# Patient Record
Sex: FEMALE | Race: WHITE | NOT HISPANIC OR LATINO | Employment: FULL TIME | URBAN - METROPOLITAN AREA
[De-identification: names, ages, dates, MRNs, and addresses within clinical notes are randomized per-mention and may not be internally consistent; named-entity substitution may affect disease eponyms.]

---

## 2017-09-11 ENCOUNTER — OFFICE VISIT - RIVER FALLS (OUTPATIENT)
Dept: FAMILY MEDICINE | Facility: CLINIC | Age: 47
End: 2017-09-11

## 2017-09-20 ENCOUNTER — OFFICE VISIT - RIVER FALLS (OUTPATIENT)
Dept: FAMILY MEDICINE | Facility: CLINIC | Age: 47
End: 2017-09-20

## 2017-09-20 ASSESSMENT — MIFFLIN-ST. JEOR: SCORE: 993.3

## 2018-10-16 ENCOUNTER — OFFICE VISIT - RIVER FALLS (OUTPATIENT)
Dept: FAMILY MEDICINE | Facility: CLINIC | Age: 48
End: 2018-10-16

## 2018-10-16 ASSESSMENT — MIFFLIN-ST. JEOR: SCORE: 984.68

## 2020-01-27 ENCOUNTER — OFFICE VISIT - RIVER FALLS (OUTPATIENT)
Dept: FAMILY MEDICINE | Facility: CLINIC | Age: 50
End: 2020-01-27

## 2020-01-27 ASSESSMENT — MIFFLIN-ST. JEOR: SCORE: 991.03

## 2020-01-28 ENCOUNTER — COMMUNICATION - RIVER FALLS (OUTPATIENT)
Dept: FAMILY MEDICINE | Facility: CLINIC | Age: 50
End: 2020-01-28

## 2020-01-28 LAB
CANDIDA (HISTORICAL): NOT DETECTED
GARDNERELLA (HISTORICAL): DETECTED
TRICHOMONAS VAGINALIS DNA PCR: NOT DETECTED

## 2020-01-29 ENCOUNTER — TRANSFERRED RECORDS (OUTPATIENT)
Dept: HEALTH INFORMATION MANAGEMENT | Facility: CLINIC | Age: 50
End: 2020-01-29

## 2020-01-29 LAB — HPV ABSTRACT: NORMAL

## 2020-01-30 ENCOUNTER — COMMUNICATION - RIVER FALLS (OUTPATIENT)
Dept: FAMILY MEDICINE | Facility: CLINIC | Age: 50
End: 2020-01-30

## 2022-02-12 VITALS
WEIGHT: 110.6 LBS | HEIGHT: 56 IN | TEMPERATURE: 98.8 F | SYSTOLIC BLOOD PRESSURE: 110 MMHG | HEART RATE: 62 BPM | BODY MASS INDEX: 24.88 KG/M2 | DIASTOLIC BLOOD PRESSURE: 62 MMHG

## 2022-02-12 VITALS
HEIGHT: 57 IN | SYSTOLIC BLOOD PRESSURE: 118 MMHG | BODY MASS INDEX: 23.51 KG/M2 | SYSTOLIC BLOOD PRESSURE: 104 MMHG | TEMPERATURE: 99.2 F | WEIGHT: 109 LBS | HEART RATE: 72 BPM | WEIGHT: 110 LBS | BODY MASS INDEX: 23.8 KG/M2 | DIASTOLIC BLOOD PRESSURE: 66 MMHG | TEMPERATURE: 99.1 F | DIASTOLIC BLOOD PRESSURE: 70 MMHG | HEART RATE: 72 BPM

## 2022-02-12 VITALS
BODY MASS INDEX: 25.19 KG/M2 | HEIGHT: 56 IN | SYSTOLIC BLOOD PRESSURE: 110 MMHG | WEIGHT: 112 LBS | DIASTOLIC BLOOD PRESSURE: 70 MMHG | HEART RATE: 86 BPM | OXYGEN SATURATION: 98 %

## 2022-02-15 NOTE — PROCEDURES
Accession Number:       [id number]  CLINICAL INFORMATION::     None given  LMP::     [id number]  PREV. PAP::     10/19/2015  PREV. BX::     NONE GIVEN  SOURCE::     Cervix, Endocervix  STATEMENT OF ADEQUACY::     Satisfactory for evaluation. Endocervical/transformation zone component present.  INTERPRETATION/RESULT::     Negative for intraepithelial lesion or malignancy.  COMMENT::     This Pap test has been evaluated with computer assisted technology.  CYTOTECHNOLOGIST::     TERRY ZEE(ASCP) CT Screening location: [place] [address]  COMMENT:     See comment       EXPLANATORY NOTE:         The Pap is a screening test for cervical cancer. It is       not a diagnostic test and is subject to false negative       and false positive results. It is most reliable when a       satisfactory sample, regularly obtained, is submitted       with relevant clinical findings and history, and when       the Pap result is evaluated along with historic and       current clinical information.  HPV mRNA E6/E7:     Not Detected       This test was performed using the APTIMA HPV Assay ([employer]       This assay detects E6/E7 viral messenger RNA (mRNA) from 14       high-risk HPV types (16,18,31,33,35,39,45,51,52,56,58,59,66,68).         The analytical performance characteristics of       this assay have been determined by [employer]. The modifications have not been       cleared or approved by the FDA. This assay has       been validated pursuant to the CLIA regulations       and is used for clinical purposes.

## 2022-02-15 NOTE — PROGRESS NOTES
Patient:   CRYSTAL JUNIOR            MRN: [id number]            FIN: [id number]               Age:   48 years     Sex:  Female     :  1970   Associated Diagnoses:   Well adult exam   Author:   Sam Dennis MD      Visit Information   Visit type:  Annual exam.    Source of history:  Self.    History limitation:  None.       Chief Complaint   10/16/2018 4:18 PM CDT   Annual exam      Well Adult History   Well Adult History             The patient presents for well adult exam.  The patient's general health status is described as good.  The patient's diet is described as balanced.  Exercise: routine.  Associated symptoms consist of none.  Medical encounters: none.  Additional pertinent history: discussed preventive screening.        Review of Systems   All other systems reviewed and negative      Health Status   Allergies:    Allergic Reactions (Selected)  No known allergies   Medications:  (Selected)   , no daily medications   Problem list:    All Problems  Inactive: Chicken Pox / ICD-9-.9  Resolved: Pregnancy / SNOMED CT 199855781  Resolved: Pregnancy / SNOMED CT 411674232  Resolved: Pregnancy / SNOMED CT 855410021      Histories   Past Medical History:    Resolved  Pregnancy (SNOMED CT 695519473):  Resolved in  at 31 years.  Pregnancy (SNOMED CT 712085453):  Resolved in  at 27 years.  Pregnancy (SNOMED CT 058577677):  Resolved in  at 25 years.   Family History:    Aunt (M)  CA - Cancer of colon  Sister  Obsessive compulsive disorder  Brother  Obsessive compulsive disorder  Brother  Obsessive compulsive disorder  Grandmother (M)  Diabetes mellitus  Aunt (M)  CA - Cancer  Comments:  2012 2:41 PM - Melissa Castro  ovarian  Great Grandmother (P)  CVA - Cerebrovascular accident  Mother    History is negative.  Father  CABG - Coronary artery bypass graft  MI  Parkinson disease     Procedure history:     x 3.   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      Home and Environment Assessment            Marital status: .  Spouse/Partner name: Darryl Lou.      Exercise and Physical Activity Assessment                     Comments:                      11/23/2012 - Melissa Castro                     occasionally      Sexual Assessment            Sexually active: Yes.  Sexual orientation: Heterosexual.      Other Assessment            3 children Reina Garza Kacie        Physical Examination   Vital Signs   10/16/2018 4:18 PM CDT Temperature Tympanic 98.8 DegF    Peripheral Pulse Rate 62 bpm    Pulse Site Radial artery    HR Method Manual    Systolic Blood Pressure 110 mmHg    Diastolic Blood Pressure 62 mmHg    Mean Arterial Pressure 78 mmHg    BP Site Right arm    BP Method Manual      Measurements from flowsheet : Measurements   10/16/2018 4:18 PM CDT Height Measured - Standard 56 in    Weight Measured - Standard 110.6 lb    BSA 1.41 m2    Body Mass Index 24.79 kg/m2      General:  Alert and oriented, No acute distress.    Eye:  Pupils are equal, round and reactive to light, Extraocular movements are intact, Normal conjunctiva.    HENT:  Normocephalic, Tympanic membranes are clear, Oral mucosa is moist, No pharyngeal erythema.    Neck:  Supple, Non-tender, No lymphadenopathy, No thyromegaly.    Respiratory:  Lungs are clear to auscultation.    Cardiovascular:  Normal rate, Regular rhythm.    Breast:  No mass, No tenderness, No discharge.    Gastrointestinal:  Soft, Non-tender, Non-distended, No organomegaly.    Musculoskeletal:  Normal range of motion, Normal strength.    Integumentary:  Warm, Dry, Pink, No rash, no concerning lesions.    Neurologic:  Alert, Oriented, Normal sensory.    Psychiatric:  Cooperative, Appropriate mood & affect.       Impression and Plan    Diagnosis     Well adult exam (QFB84-ZR Z00.00).     Course:  Progressing as expected.    Patient Instructions:       Counseled: Patient, BMI, diet, and exercise.

## 2022-02-15 NOTE — PROGRESS NOTES
Patient:   CRYSTAL JUNIOR            MRN: [id number]            FIN: [id number]               Age:   47 years     Sex:  Female     :  1970   Associated Diagnoses:   Dressing change   Author:   Edelmira Cornelius      Visit Information      Date of Service: 2017 04:13 pm  Performing Location: [facility]  Encounter#: [id number]      Chief Complaint   2017 4:20 PM CDT    f/u R index finger laceration that happened yesterday. Was seen in [facility] ER yesterday and just needs dressing change. Was rx'd abx.        History of Present Illness   PPC for dressing change to right index finger, was seen in [facility] ED by Dr Faith on 9/10/17 after hand was caught in wood splitter, sustained a crash laceration injury  xrays negative, interrupted nylon sutures for wound closure and pt was started on keflex, last tetanus   she was told to return to clinic in 24 hours for wound check  she tells me pain is minimal, using ibuprofen when it does hurt         Review of Systems   Constitutional:  Negative.    Hematology/Lymphatics:  Negative.    Immunologic:  Negative.    Musculoskeletal:  Negative except as documented in history of present illness.    Integumentary:  Negative except as documented in history of present illness.    Neurologic:  Negative.    Psychiatric:  Negative.       Health Status   Allergies:    Allergic Reactions (Selected)  No known allergies      Physical Examination   Vital Signs   2017 4:20 PM CDT Temperature Tympanic 99.1 DegF    Peripheral Pulse Rate 72 bpm    Pulse Site Radial artery    HR Method Manual    Systolic Blood Pressure 104 mmHg    Diastolic Blood Pressure 70 mmHg    Mean Arterial Pressure 81 mmHg    BP Site Right arm    BP Method Manual      General:  Alert and oriented, No acute distress.    Integumentary:  right index finger with 6cm sickle shapred closed wound with about 13 interrupted nylon sutures   using sea clens the wound and dried scab was cleared, scant  amount ot localized swelling, no purulent drainage and neurvascular integrity maintained to finger tip  adaptic, kerlex and coban to anchor applied without difficulty.    Neurologic:  Alert, Oriented.    Psychiatric:  Cooperative, Appropriate mood & affect.       Impression and Plan   Diagnosis     Dressing change (HVO23-KH Z48.00).     Plan:  keep dressing clean and dry, I am happy to see her again this week for dressing change but pt feels she can do this at home, continue on with keflex, anticipate burning/slow improvement with any nerve pain associated numbness, if fever, redness, drainage or increased swelling occurs please be rechecked  sutures out 10d from insertion so around 9/20/17.

## 2022-02-15 NOTE — PROGRESS NOTES
Patient:   CRYSTAL JUNIOR            MRN: [id number]            FIN: [id number]               Age:   49 years     Sex:  Female     :  1970   Associated Diagnoses:   Vaginal discharge; Well adult exam   Author:   Sam Dennis MD      Visit Information   Visit type:  Annual exam.    Source of history:  Self.    History limitation:  None.       Chief Complaint   2020 3:55 PM CST    Physical and PAP      Well Adult History   Well Adult History             The patient presents for well adult exam.  The patient's general health status is described as good.  No specific concerns at this time. Due for pap smear. Reviewed mammograms.  The patient's diet is described as balanced.  Exercise: routine.  Associated symptoms consist of none.  Last menstrual period: have been a little more irregular recently, had some spotting.  Medical encounters: none.        Review of Systems   All other systems reviewed and negative      Health Status   Allergies:    Allergic Reactions (Selected)  No Known Medication Allergies   Medications:  (Selected)      Problem list:    All Problems  Inactive: Chicken Pox / ICD-9-.9  Resolved: Pregnancy / SNOMED CT 877184668  Resolved: Pregnancy / SNOMED CT 911763252  Resolved: Pregnancy / SNOMED CT 420893301      Histories   Past Medical History:    Resolved  Pregnancy (SNOMED CT 037061871):  Resolved in  at 31 years.  Pregnancy (SNOMED CT 264811456):  Resolved in  at 27 years.  Pregnancy (SNOMED CT 481242999):  Resolved in  at 25 years.   Family History:    MI  Father  Anxiety  Son (Greg)  Diabetes mellitus  Grandmother (M)  CA - Cancer  Aunt (M)  Comments:  2012 2:41 PM CST - Melissa Castro  ovarian  CA - Cancer of colon  Aunt (M)  Cerebral palsy  Son (Greg)  CVA - Cerebrovascular accident  Great Grandmother (P)  Obsessive compulsive disorder  Sister  Brother  Brother  Parkinson disease  Father  CABG - Coronary artery bypass graft  Father     Procedure history:      x 3.   Social History:        Alcohol Assessment: Current            occasional use      Tobacco Assessment: Denies Tobacco Use            Never      Substance Abuse Assessment: Denies Substance Abuse            Never      Employment and Education Assessment            Employed.  customer service Work/School description:.      Home and Environment Assessment            Marital status: .  Spouse/Partner name: Darryl Lou.      Nutrition and Health Assessment            Type of diet: Regular.      Exercise and Physical Activity Assessment                     Comments:                      2012 - Melissa Castro                     occasionally      Sexual Assessment            Sexually active: Yes.  Sexual orientation: Heterosexual.            Contraceptive Use Details:  had vasectomy.      Other Assessment            First menses age 14.  Regular menses.  Menstrual duration 5 days.  Cycle interval 28-30 days.  No abnormal               Pap smear.        Physical Examination   Last Menstrual Period: 2020   Vital Signs   2020 3:55 PM CST Peripheral Pulse Rate 86 bpm    Systolic Blood Pressure 110 mmHg    Diastolic Blood Pressure 70 mmHg    Mean Arterial Pressure 83 mmHg    Oxygen Saturation 98 %      Measurements from flowsheet : Measurements   2020 3:55 PM CST Height Measured - Standard 56 in    Weight Measured - Standard 112.0 lb    BSA 1.42 m2    Body Mass Index 25.11 kg/m2  HI      General:  Alert and oriented, No acute distress.    Eye:  Pupils are equal, round and reactive to light, Extraocular movements are intact, Normal conjunctiva.    HENT:  Normocephalic, Tympanic membranes are clear, Oral mucosa is moist, No pharyngeal erythema.    Neck:  Supple, Non-tender, No lymphadenopathy, No thyromegaly.    Respiratory:  Lungs are clear to auscultation.    Cardiovascular:  Normal rate, Regular rhythm.    Breast:  No mass, No tenderness, No discharge.    Gastrointestinal:  Soft,  Non-tender, Non-distended, No organomegaly.    Genitourinary:  Normal genitalia for age and sex, No urethral discharge, No lesions, yellow vaginal discharge noted.         Perineum: Within normal limits.         Vagina: Within normal limits.         Uterus: Within normal limits.         Ovaries: Within normal limits.         Adnexa: Within normal limits.    Musculoskeletal:  Normal range of motion, Normal strength.    Integumentary:  Warm, Dry, Pink, No rash, no concerning lesions.    Neurologic:  Alert, Oriented, Normal sensory.    Psychiatric:  Cooperative, Appropriate mood & affect.       Review / Management   Results review      Impression and Plan   Diagnosis     Well adult exam (OIX46-RO Z00.00).     Course:  Progressing as expected.    Patient Instructions:       Counseled: Patient, BMI, diet, and exercise.    Diagnosis     Vaginal discharge (IGA11-YK N89.8).     Course:  will check for BV overgrowth.    Orders     Orders (Selected)   Outpatient Orders  Ordered (In Transit)  BV/Vaginitis Panel DNA Probe* ([facility]): Specimen Type: Vaginal, Collection Date: 01/27/20 16:25:00 CST.

## 2022-02-15 NOTE — TELEPHONE ENCOUNTER
A summary of the submitted changes has been added to the patient's chart. You may review the document by clicking on the message attachment located above.Submission Details -Originating Source: PatientOriginating Author: Yudith Eppsbmission Date: 10/16/2018 at 01:55 AMAppointment Details - Appointment Date: 10/16/2018 at 02:20 PM Appointment Type: PHYSICAL - Appointment Resource: Jeannie ARVIZU, [facility]

## 2022-02-15 NOTE — LETTER
(Inserted Image. Unable to display)   [address] [place] [address]  January 30, 2020      CRYSTAL JUNIOR       [address], [place] [id number]      Dear CRYSTAL,    Thank you for selecting [facility] for your healthcare needs. Below you will find the results of the recent tests done at our clinic.     Normal pap smear with negative HPV.  The pap can be repeated every five years unless there is concern about increased risk. We still will want to see you once a year for an annual exam.    Result Name Current Result   ThinPrep PAP with HPV   1/27/2020       Please contact me or my assistant at [phone] if you have any questions or concerns.     Sincerely,        Sam Dennis M.D.

## 2022-02-15 NOTE — TELEPHONE ENCOUNTER
---------------------  From: Sam Dennis MD   To: CRYSTAL JUNIOR S    Sent: 1/28/2020 7:37:56 PM CST  Subject: Patient Message - Results Notification     Camilo Zurita,  Testing did show bacterial overgrowth consistent with bacterial vaginosis. This can cause discharge, spotting, and cramping. It is similar to a yeast infection, but is a bacterial overgrowth instead of yeast. It is treated with metronidazole tablets twice a day for a week. Sorry I missed you by phone. Please call or message the clinic to let us know which pharmacy to send the prescription to and let us know if you have questions. The clinic reopens tomorrow (Wednesday) at 8am.  Thanks,  Kathy Dennis    Results:  Date Result Name Ind Value Ref Range   1/27/2020 4:31 PM Gardnerella ((A)) DETECTED (NOT DETECTED - )   1/27/2020 4:31 PM Candida  NOT DETECTED (NOT DETECTED - )   1/27/2020 4:31 PM Trichomonas DNA Probe  NOT DETECTED (NOT DETECTED - )

## 2022-02-15 NOTE — NURSING NOTE
Comprehensive Intake Entered On:  1/27/2020 3:58 PM CST    Performed On:  1/27/2020 3:55 PM CST by Tiffany Allison CMA               Summary   Chief Complaint :   Physical and PAP   Last Menstrual Period :   1/9/2020 CST   Weight Measured :   112.0 lb(Converted to: 112 lb 0 oz, 50.80 kg)    Height Measured :   56 in(Converted to: 4 ft 8 in, 142.24 cm)    Body Mass Index :   25.11 kg/m2 (HI)    Body Surface Area :   1.42 m2   Systolic Blood Pressure :   110 mmHg   Diastolic Blood Pressure :   70 mmHg   Mean Arterial Pressure :   83 mmHg   Peripheral Pulse Rate :   86 bpm   Oxygen Saturation :   98 %   Tiffany Allison CMA - 1/27/2020 3:55 PM CST   Health Status   Allergies Verified? :   Yes   Medication History Verified? :   Yes   Medical History Verified? :   Yes   Pre-Visit Planning Status :   Completed   Tobacco Use? :   Never smoker   Tiffany Allison CMA - 1/27/2020 3:55 PM CST   Consents   Consent for Immunization Exchange :   Consent Granted   Consent for Immunizations to Providers :   Consent Granted   Tiffany Allison CMA - 1/27/2020 3:55 PM CST   Meds / Allergies   (As Of: 1/27/2020 3:58:58 PM CST)   Allergies (Active)   No Known Medication Allergies  Estimated Onset Date:   Unspecified ; Created By:   Mayra Dexter CMA; Reaction Status:   Active ; Category:   Drug ; Substance:   No Known Medication Allergies ; Type:   Allergy ; Updated By:   Mayra Dexter CMA; Reviewed Date:   1/27/2020 3:57 PM CST        Medication List   (As Of: 1/27/2020 3:58:58 PM CST)   No Known Home Medications     Tiffany Allison CMA - 1/27/2020 3:53:28 PM

## 2022-02-15 NOTE — TELEPHONE ENCOUNTER
----- Message from Amrit French sent at 1/20/2017 10:49 AM CST -----  Contact: pt  Pt is calling to check the status of his prescription refill, called the refills in on Tuesday  Call Back#[phone]  Thanks     ---------------------  From: Sam Dennis MD   To: Phone Messages Pool ([id number] - [place]);     Sent: 1/28/2020 7:35:56 PM CST  Subject: prescription     ** Submitted: **  Order:metroNIDAZOLE (Flagyl 500 mg oral tablet)  1 tab(s)  Oral  q12 hrs  Qty:  14 tab(s)        Duration:  7 day(s)        Refills:  0          Substitutions Allowed     Don't Print - other reason (Rx)    Signed by Sam Dennis MD  1/28/2020 7:34:00 PM    Called patient with results. No answer. Left message that I was sending a message through the portal and to call if she can't access it.     Should treat with flagyl 500mg bid x 7 days. Likely would decrease spotting and discomfort. Asked patient to let us know which pharmacy to send it to as she does not have one listed.           Results:  Date Result Name Ind Value Ref Range   1/27/2020 4:31 PM Gardnerella ((A)) DETECTED (NOT DETECTED - )   1/27/2020 4:31 PM Candida  NOT DETECTED (NOT DETECTED - )   1/27/2020 4:31 PM Trichomonas DNA Probe  NOT DETECTED (NOT DETECTED - )[facility]

## 2022-02-15 NOTE — LETTER
(Inserted Image. Unable to display)   October 16, 2019      CRYSTAL JUNIOR   [address], [place] [id number]        Dear CRYSTAL,      Thank you for selecting [facility] (previously [facility]) for your healthcare needs.     Our records indicate you are due for the following services:     Annual Physical    To schedule an appointment or if you have further questions, please contact your primary clinic:   [facility]          [phone]   [facility]    [phone]             [facility]         [phone]      Powered by [employer]    Sincerely,    Sam Dennis M.D.

## 2022-02-15 NOTE — TELEPHONE ENCOUNTER
---------------------  From: Yary Handy   To: CRYSTAL JUNIOR    Sent: 3/8/2021 12:40:00 PM CST  Subject: General Message     You are due for a yearly physical. If you would like to schedule this, please call us at [phone]. Thank you!

## 2022-02-15 NOTE — PROGRESS NOTES
Patient:   CRYSTAL JUNIOR            MRN: [id number]            FIN: [id number]               Age:   47 years     Sex:  Female     :  1970   Associated Diagnoses:   Visit for suture removal   Author:   Katina Villanueva      Procedure   Staple/ Suture removal procedure   Date/ Time:  2017 4:25:00 PM.     Confirmed: patient, procedure, side, site, safety procedures followed, verbal consent given for procedure, she has 13 stitches in her right pointer finger after hitting it with a wood spliter 10 days ago and having it repaired in the [facility] ER.  She has kept it clean and dry and feels it is healing. Is on antibiotic for hit.     Performed by: Katina Villanueva.     Insertion/ Removal: Number of sutures removed  13.     Procedure tolerated: well.     Complications: none, wound well approximated except at distal end it gaping a bit. I applied two  1/4 inch steri strip and a tube gauze was applied to give it some added protection over the next 24-48 hours, counseled on skin care and finish antibiotics.        Impression and Plan   Diagnosis     Visit for suture removal (OFH97-HT Z48.02).     Counseled

## 2022-02-15 NOTE — LETTER
(Inserted Image. Unable to display)   January 28, 2021      CRYSTAL JUNIOR   [address], [place] [id number]        Dear CRYSTAL,      Thank you for selecting [facility] (previously [facility]) for your healthcare needs.     Our records indicate you are due for the following services:     Annual Physical    (FYI   Regarding office visits: In some instances, a video visit or telephone visit may be offered as an option.)      To schedule an appointment or if you have further questions, please contact your clinic at [phone].      Powered by [employer]    Sincerely,    Sam Dennis M.D.

## 2022-02-16 ENCOUNTER — OFFICE VISIT - RIVER FALLS (OUTPATIENT)
Dept: FAMILY MEDICINE | Facility: CLINIC | Age: 52
End: 2022-02-16

## 2022-03-02 VITALS
WEIGHT: 56.25 LBS | HEART RATE: 72 BPM | SYSTOLIC BLOOD PRESSURE: 118 MMHG | DIASTOLIC BLOOD PRESSURE: 80 MMHG | TEMPERATURE: 98.4 F | RESPIRATION RATE: 16 BRPM | BODY MASS INDEX: 7.62 KG/M2 | HEIGHT: 72 IN

## 2022-03-02 NOTE — LETTER
(Inserted Image. Unable to display)   [address] [facility] [address]  [phone] (phone) [phone] (fax)  February 16, 2022        CRYSTAL JUNIOR   [address] [place] [id number]      Dear CRYSTAL,      Thank you for selecting [facility] for your healthcare needs.      This letter is to inform you that we have received a copy of your mammogram results.  Your results were normal.  You will also receive notice of your results from the radiologist within 7-10 days.  This letter is to let you know I have also received a copy and it is filed in your clinic chart.      Please plan on having your next mammogram done in 12 months.       Please contact me or my assistant at [phone] if you have any questions or concerns.     Sincerely,      Sam Dennis MD

## 2022-03-02 NOTE — PROGRESS NOTES
Patient:   CRYSTAL JUNIOR            MRN: [id number]            FIN: [id number]               Age:   51 years     Sex:  Female     :  1970   Associated Diagnoses:   Well adult exam   Author:   Sam Dennis MD      Visit Information   Visit type:  Annual exam.    Source of history:  Self.    History limitation:  None.       Chief Complaint   2022 3:37 PM CST    Pt here for Px.  Pt is not fasting today      Well Adult History   Well Adult History             The patient presents for well adult exam.  The patient's general health status is described as good.  The patient's diet is described as balanced.  Associated symptoms consist of none.  Medical encounters: none.        Review of Systems   ROS reviewed as documented in chart      Health Status   Allergies:    Allergic Reactions (Selected)  No Known Medication Allergies   Medications:  (Selected)   ,    Medications          No Known Home Medications     Problem list:    All Problems  Inactive: Chicken Pox / ICD-9-.9  Resolved: Pregnancy / SNOMED CT 138427577  Resolved: Pregnancy / SNOMED CT 236866184  Resolved: Pregnancy / SNOMED CT 457398944      Histories   Past Medical History:    Resolved  Pregnancy (SNOMED CT 078653742):  Resolved in  at 31 years.  Pregnancy (SNOMED CT 567417466):  Resolved in  at 27 years.  Pregnancy (SNOMED CT 827648415):  Resolved in  at 25 years.   Family History:    MI  Father  Anxiety  Son (Greg)  Diabetes mellitus  Grandmother (M)  CA - Cancer  Aunt (M)  Comments:  2012 2:41 PM CST - Melissa Castro  ovarian  CA - Cancer of colon  Aunt (M)  Cerebral palsy  Son (Greg)  CVA - Cerebrovascular accident  Great Grandmother (P)  Obsessive compulsive disorder  Sister  Brother  Brother  Parkinson disease  Father  CABG - Coronary artery bypass graft  Father     Procedure history:    No active procedure history items have been selected or recorded.   Social History:        Electronic Cigarette/Vaping  Assessment            Electronic Cigarette Use: Never.      Alcohol Assessment: Current            occasional use      Tobacco Assessment: Denies Tobacco Use            Never (less than 100 in lifetime)            Never      Substance Abuse Assessment: Denies Substance Abuse            Never      Employment and Education Assessment            Employed.  customer service Work/School description:.      Home and Environment Assessment            Marital status: .  Spouse/Partner name: Darryl Lou.      Nutrition and Health Assessment            Type of diet: Regular.      Exercise and Physical Activity Assessment                     Comments:                      11/23/2012 - Melissa Castro                     occasionally      Sexual Assessment            Sexually active: Yes.  Sexual orientation: Heterosexual.            Contraceptive Use Details:  had vasectomy.      Other Assessment            First menses age 14.  Regular menses.  Menstrual duration 5 days.  Cycle interval 28-30 days.  No abnormal               Pap smear.        Physical Examination   Last Menstrual Period: 2/13/2022   Vital Signs   2/16/2022 3:37 PM CST Temperature Tympanic 98.4 DegF    Peripheral Pulse Rate 72 bpm    Pulse Site Radial artery    Respiratory Rate 16 br/min    Systolic Blood Pressure 118 mmHg    Diastolic Blood Pressure 80 mmHg    Mean Arterial Pressure 93 mmHg    BP Site Right arm      Measurements from flowsheet : Measurements   2/16/2022 3:37 PM CST Height Measured - Standard 114 in    Weight Measured - Standard 56.25 lb    BSA 1.43 m2    Body Mass Index 3.04 kg/m2  LOW      General:  Alert and oriented, No acute distress.    Eye:  Pupils are equal, round and reactive to light, Extraocular movements are intact, Normal conjunctiva.    HENT:  Normocephalic, Tympanic membranes are clear, Oral mucosa is moist, No pharyngeal erythema.    Neck:  Supple, Non-tender, No lymphadenopathy, No thyromegaly.    Respiratory:  Lungs  are clear to auscultation.    Cardiovascular:  Normal rate, Regular rhythm.    Gastrointestinal:  Soft, Non-tender, Non-distended, No organomegaly.    Musculoskeletal:  Normal range of motion, Normal strength.    Integumentary:  Warm, Dry, Pink, No rash, no concerning lesions.    Neurologic:  Alert, Oriented, Normal sensory.    Psychiatric:  Cooperative, Appropriate mood & affect.       Impression and Plan   Diagnosis     Well adult exam (VNV96-UX Z00.00).     Course:  Progressing as expected.    Patient Instructions:       Counseled: Patient, BMI, diet, and exercise.

## 2022-03-02 NOTE — NURSING NOTE
Pt cologuard order form completed and faxed to [employer] per pt request.  [phone].  confirmation received.  Oj Calhoun CMA

## 2022-03-02 NOTE — NURSING NOTE
Comprehensive Intake Entered On:  2/16/2022 3:44 PM CST    Performed On:  2/16/2022 3:37 PM CST by Oj Calhoun CMA               Summary   Chief Complaint :   Pt here for Px.  Pt is not fasting today   Last Menstrual Period :   2/13/2022 CST   Menstrual Status :   Menarcheal   Weight Measured :   56.25 lb(Converted to: 56 lb 4 oz, 25.515 kg)    Height Measured :   114 in(Converted to: 9 ft 6 in, 289.56 cm)    Body Mass Index :   3.04 kg/m2 (LOW)    Body Surface Area :   1.43 m2   Systolic Blood Pressure :   118 mmHg   Diastolic Blood Pressure :   80 mmHg   Mean Arterial Pressure :   93 mmHg   Peripheral Pulse Rate :   72 bpm   BP Site :   Right arm   Pulse Site :   Radial artery   Temperature Tympanic :   98.4 DegF(Converted to: 36.9 DegC)    Respiratory Rate :   16 br/min   Oj Calhoun CMA - 2/16/2022 3:37 PM CST   Health Status   Allergies Verified? :   Yes   Medication History Verified? :   No   Medical History Verified? :   Yes   Pre-Visit Planning Status :   Completed   Tobacco Use? :   Never smoker   Oj Calhoun CMA - 2/16/2022 3:37 PM CST   Meds / Allergies   (As Of: 2/16/2022 3:44:03 PM CST)   Allergies (Active)   No Known Medication Allergies  Estimated Onset Date:   Unspecified ; Created By:   Mayra Dexter CMA; Reaction Status:   Active ; Category:   Drug ; Substance:   No Known Medication Allergies ; Type:   Allergy ; Updated By:   Mayra Dexter CMA; Reviewed Date:   2/16/2022 3:41 PM CST        Medication List   (As Of: 2/16/2022 3:44:03 PM CST)   No Known Home Medications     Oj Calhoun CMA - 2/16/2022 3:37:23 PM           Social History   Social History   (As Of: 2/16/2022 3:44:03 PM CST)   Alcohol:  Current      occasional use   (Last Updated: 10/16/2012 8:43:51 AM CDT by Na Chirinos CMA)          Tobacco:  Denies Tobacco Use      Never   (Last Updated: 10/16/2012 8:43:34 AM CDT by Na Chirinos CMA)   Never (less than 100 in lifetime)   (Last Updated: 2/16/2022 3:38:08 PM  CST by Oj Calhoun CMA)          Electronic Cigarette/Vaping:        Electronic Cigarette Use: Never.   (Last Updated: 2/16/2022 3:38:11 PM CST by Oj Calhoun CMA)          Substance Abuse:  Denies Substance Abuse      Never   (Last Updated: 10/16/2012 8:43:40 AM CDT by Na Chirinos CMA)          Employment/School:          Employed.  customer service Work/School description:.   (Last Updated: 1/3/2019 7:51:51 PM UTC by Mayra Dexter CMA)          Home/Environment:        Marital status: .  Spouse/Partner name: Darryl Lou.   (Last Updated: 10/16/2012 8:44:56 AM CDT by Na Chirinos CMA)          Nutrition/Health:        Type of diet: Regular.   (Last Updated: 10/31/2018 2:38:07 PM CDT by Abby Rousseau)          Exercise:         Comments:  11/23/2012 2:40 PM - Melissa Castro: occasionally   (Last Updated: 11/23/2012 2:40:01 PM CST by Melissa Castro)          Sexual:        Sexually active: Yes.  Sexual orientation: Heterosexual.   (Last Updated: 10/16/2012 8:44:23 AM CDT by Na Chirinos CMA)   Contraceptive Use Details:  had vasectomy.   (Last Updated: 10/31/2018 2:38:57 PM CDT by Abby Rousseau)          Other:        First menses age 14.  Regular menses.  Menstrual duration 5 days.  Cycle interval 28-30 days.  No abnormal Pap smear.   (Last Updated: 10/31/2018 2:37:59 PM CDT by Abby Rousseau)

## 2023-01-02 ENCOUNTER — NURSE TRIAGE (OUTPATIENT)
Dept: NURSING | Facility: CLINIC | Age: 53
End: 2023-01-02

## 2023-01-02 ENCOUNTER — OFFICE VISIT (OUTPATIENT)
Dept: FAMILY MEDICINE | Facility: CLINIC | Age: 53
End: 2023-01-02
Payer: COMMERCIAL

## 2023-01-02 VITALS
RESPIRATION RATE: 18 BRPM | WEIGHT: 111 LBS | HEIGHT: 57 IN | BODY MASS INDEX: 23.95 KG/M2 | TEMPERATURE: 98.1 F | HEART RATE: 80 BPM | OXYGEN SATURATION: 99 % | SYSTOLIC BLOOD PRESSURE: 118 MMHG | DIASTOLIC BLOOD PRESSURE: 82 MMHG

## 2023-01-02 DIAGNOSIS — M25.562 ACUTE PAIN OF LEFT KNEE: ICD-10-CM

## 2023-01-02 DIAGNOSIS — S83.412A SPRAIN OF MEDIAL COLLATERAL LIGAMENT OF LEFT KNEE, INITIAL ENCOUNTER: Primary | ICD-10-CM

## 2023-01-02 PROCEDURE — 99213 OFFICE O/P EST LOW 20 MIN: CPT | Performed by: FAMILY MEDICINE
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
## 2023-01-08 ENCOUNTER — TRANSFERRED RECORDS (OUTPATIENT)
Dept: HEALTH INFORMATION MANAGEMENT | Facility: CLINIC | Age: 53
End: 2023-01-08

## 2023-01-08 LAB — COLOGUARD-ABSTRACT: NEGATIVE

## 2023-01-27 ENCOUNTER — TELEPHONE (OUTPATIENT)
Dept: FAMILY MEDICINE | Facility: CLINIC | Age: 53
End: 2023-01-27
Payer: COMMERCIAL

## 2023-01-27 NOTE — TELEPHONE ENCOUNTER
Pt dropped off a disabled dependent application form to give to Dr. Dennis's care team.     Pt would like it filled out as soon as [facility] can and to give her a call once its filled out that way she can pick it up at the .     Form will be up front and routed to [facility] provider box by end of day.    Best # to reach pt: [phone] - OK to [facility] if she does not answer.

## 2023-02-12 ENCOUNTER — HEALTH MAINTENANCE LETTER (OUTPATIENT)
Age: 53
End: 2023-02-12

## 2023-04-03 ASSESSMENT — ENCOUNTER SYMPTOMS
PALPITATIONS: 0
NERVOUS/ANXIOUS: 0
COUGH: 0
JOINT SWELLING: 0
DIARRHEA: 0
FEVER: 0
NAUSEA: 0
HEARTBURN: 0
CONSTIPATION: 0
PARESTHESIAS: 0
ABDOMINAL PAIN: 0
DIZZINESS: 0
HEMATOCHEZIA: 0
EYE PAIN: 0
MYALGIAS: 0
FREQUENCY: 0
HEADACHES: 0
SHORTNESS OF BREATH: 0
BREAST MASS: 0
SORE THROAT: 0
DYSURIA: 0
HEMATURIA: 0
ARTHRALGIAS: 0
WEAKNESS: 0
CHILLS: 0

## 2023-04-05 ENCOUNTER — OFFICE VISIT (OUTPATIENT)
Dept: FAMILY MEDICINE | Facility: CLINIC | Age: 53
End: 2023-04-05
Payer: COMMERCIAL

## 2023-04-05 VITALS
SYSTOLIC BLOOD PRESSURE: 124 MMHG | HEART RATE: 85 BPM | WEIGHT: 107.2 LBS | OXYGEN SATURATION: 99 % | HEIGHT: 56 IN | DIASTOLIC BLOOD PRESSURE: 84 MMHG | BODY MASS INDEX: 24.12 KG/M2 | RESPIRATION RATE: 10 BRPM

## 2023-04-05 DIAGNOSIS — Z13.1 SCREENING FOR DIABETES MELLITUS: ICD-10-CM

## 2023-04-05 DIAGNOSIS — Z13.6 SCREENING FOR CARDIOVASCULAR CONDITION: ICD-10-CM

## 2023-04-05 DIAGNOSIS — Z12.31 VISIT FOR SCREENING MAMMOGRAM: ICD-10-CM

## 2023-04-05 DIAGNOSIS — Z00.00 ROUTINE GENERAL MEDICAL EXAMINATION AT A HEALTH CARE FACILITY: Primary | ICD-10-CM

## 2023-04-05 PROCEDURE — 99396 PREV VISIT EST AGE 40-64: CPT | Mod: 25 | Performed by: FAMILY MEDICINE

## 2023-04-05 PROCEDURE — 80061 LIPID PANEL: CPT | Performed by: FAMILY MEDICINE

## 2023-04-05 PROCEDURE — 90750 HZV VACC RECOMBINANT IM: CPT | Performed by: FAMILY MEDICINE

## 2023-04-05 PROCEDURE — 36415 COLL VENOUS BLD VENIPUNCTURE: CPT | Performed by: FAMILY MEDICINE

## 2023-04-05 PROCEDURE — 90471 IMMUNIZATION ADMIN: CPT | Performed by: FAMILY MEDICINE

## 2023-04-05 PROCEDURE — 82947 ASSAY GLUCOSE BLOOD QUANT: CPT | Mod: QW | Performed by: FAMILY MEDICINE

## 2023-04-05 ASSESSMENT — ENCOUNTER SYMPTOMS
WEAKNESS: 0
HEADACHES: 0
BREAST MASS: 0
CHILLS: 0
DYSURIA: 0
ABDOMINAL PAIN: 0
CONSTIPATION: 0
PARESTHESIAS: 0
HEMATURIA: 0
HEMATOCHEZIA: 0
SORE THROAT: 0
SHORTNESS OF BREATH: 0
JOINT SWELLING: 0
COUGH: 0
NERVOUS/ANXIOUS: 0
FEVER: 0
DIARRHEA: 0
DIZZINESS: 0
PALPITATIONS: 0
MYALGIAS: 0
HEARTBURN: 0
ARTHRALGIAS: 0
EYE PAIN: 0
FREQUENCY: 0
NAUSEA: 0

## 2023-04-05 NOTE — PROGRESS NOTES
SUBJECTIVE:   CC: Yudith is an 52 year old who presents for preventive health visit.       4/5/2023     3:54 PM   Additional Questions   Roomed by Ashley OROURKE CMA   Accompanied by None     Patient has been advised of split billing requirements and indicates understanding: Yes     Healthy Habits:     Getting at least 3 servings of Calcium per day:  Yes    Bi-annual eye exam:  Yes    Dental care twice a year:  NO    Sleep apnea or symptoms of sleep apnea:  None    Diet:  Regular (no restrictions)    Duration of exercise:  15-30 minutes    Taking medications regularly:  Not Applicable    Medication side effects:  Not applicable    PHQ-2 Total Score: 0    Additional concerns today:  No      Today's PHQ-2 Score:       4/3/2023     4:57 PM   PHQ-2 ( 1999 [employer])   Q1: Little interest or pleasure in doing things 0   Q2: Feeling down, depressed or hopeless 0   PHQ-2 Score 0   Q1: Little interest or pleasure in doing things Not at all   Q2: Feeling down, depressed or hopeless Not at all   PHQ-2 Score 0       Have you ever done Advance Care Planning? (For example, a Health Directive, POLST, or a discussion with a medical provider or your loved ones about your wishes): No, advance care planning information given to patient to review.  Advanced care planning was discussed at today's visit.    Social History     Tobacco Use     Smoking status: Never     Passive exposure: Never     Smokeless tobacco: Never   Vaping Use     Vaping status: Never Used   Substance Use Topics     Alcohol use: Not on file             4/3/2023     4:57 PM   Alcohol Use   Prescreen: >3 drinks/day or >7 drinks/week? No     Reviewed orders with patient.  Reviewed health maintenance and updated orders accordingly - Yes      Breast Cancer Screening:    FHS-7:       4/3/2023     6:20 PM   Breast CA Risk Assessment (FHS-7)   Did any of your first-degree relatives have breast or ovarian cancer? No   Did any of your relatives have bilateral breast cancer? No  "  Did any man in your family have breast cancer? No   Did any woman in your family have breast and ovarian cancer? No   Did any woman in your family have breast cancer before age 50 y? No   Do you have 2 or more relatives with breast and/or ovarian cancer? No   Do you have 2 or more relatives with breast and/or bowel cancer? No       Mammogram Screening: Recommended annual mammography  Pertinent mammograms are reviewed under the imaging tab.    History of abnormal Pap smear: NO - age 30-65 PAP every 5 years with negative HPV co-testing recommended     Reviewed and updated as needed this visit by clinical staff   Tobacco  Allergies  Meds              Reviewed and updated as needed this visit by Provider                     Review of Systems   Constitutional: Negative for chills and fever.   HENT: Negative for congestion, ear pain, hearing loss and sore throat.    Eyes: Negative for pain and visual disturbance.   Respiratory: Negative for cough and shortness of breath.    Cardiovascular: Negative for chest pain, palpitations and peripheral edema.   Gastrointestinal: Negative for abdominal pain, constipation, diarrhea, heartburn, hematochezia and nausea.   Breasts:  Negative for tenderness, breast mass and discharge.   Genitourinary: Negative for dysuria, frequency, genital sores, hematuria, pelvic pain, urgency, vaginal bleeding and vaginal discharge.   Musculoskeletal: Negative for arthralgias, joint swelling and myalgias.   Skin: Negative for rash.   Neurological: Negative for dizziness, weakness, headaches and paresthesias.   Psychiatric/Behavioral: Negative for mood changes. The patient is not nervous/anxious.           OBJECTIVE:   /84   Pulse 85   Resp 10   Ht 1.425 m (4' 8.1\")   Wt 48.6 kg (107 lb 3.2 oz)   LMP 03/07/2023 (Exact Date)   SpO2 99%   BMI 23.95 kg/m    Physical Exam  GENERAL: healthy, alert and no distress  EYES: Eyes grossly normal to inspection, PERRL and conjunctivae and sclerae " normal  HENT: ear canals and TM's normal, nose and mouth without ulcers or lesions  NECK: no adenopathy, no asymmetry, masses, or scars and thyroid normal to palpation  RESP: lungs clear to auscultation - no rales, rhonchi or wheezes  BREAST: normal without masses, tenderness or nipple discharge and no palpable axillary masses or adenopathy  CV: regular rate and rhythm, normal S1 S2, no S3 or S4, no murmur, click or rub, no peripheral edema and peripheral pulses strong  ABDOMEN: soft, nontender, no hepatosplenomegaly, no masses and bowel sounds normal  MS: no gross musculoskeletal defects noted, no edema  SKIN: no suspicious lesions or rashes  NEURO: Normal strength and tone, mentation intact and speech normal  PSYCH: mentation appears normal, affect normal/bright        ASSESSMENT/PLAN:   Routine general medical examination at a health care facility  Health maintenance updated, preventive services reviewed, vaccines discussed, questions are answered, patient encouraged to continue annual wellness visits to review preventive services      Visit for screening mammogram  We will schedule at [facility], denies need for phone number and reports she will be able to get it scheduled without assistance    Screening for cardiovascular condition  Will return fasting  - Lipid panel reflex to direct LDL Fasting; Future  - Lipid panel reflex to direct LDL Fasting    Screening for diabetes mellitus  Will return fasting  - Glucose; Future  - Glucose            COUNSELING:  Reviewed preventive health counseling, as reflected in patient instructions       ([place])menopause management       Advance Care Planning        She reports that she has never smoked. She has never been exposed to tobacco smoke. She has never used smokeless tobacco.          Sam Dennis MD  [facility]

## 2023-04-06 LAB
CHOLEST SERPL-MCNC: 203 MG/DL
FASTING STATUS PATIENT QL REPORTED: NORMAL
GLUCOSE SERPL-MCNC: 84 MG/DL (ref 70–99)
HDLC SERPL-MCNC: 74 MG/DL
LDLC SERPL CALC-MCNC: 116 MG/DL
NONHDLC SERPL-MCNC: 129 MG/DL
TRIGL SERPL-MCNC: 63 MG/DL

## 2023-05-20 ENCOUNTER — HEALTH MAINTENANCE LETTER (OUTPATIENT)
Age: 53
End: 2023-05-20

## 2024-03-06 ENCOUNTER — PATIENT OUTREACH (OUTPATIENT)
Dept: CARE COORDINATION | Facility: CLINIC | Age: 54
End: 2024-03-06
Payer: COMMERCIAL

## 2024-03-20 ENCOUNTER — PATIENT OUTREACH (OUTPATIENT)
Dept: CARE COORDINATION | Facility: CLINIC | Age: 54
End: 2024-03-20
Payer: COMMERCIAL

## 2024-05-18 ENCOUNTER — HEALTH MAINTENANCE LETTER (OUTPATIENT)
Age: 54
End: 2024-05-18

## 2024-08-18 SDOH — HEALTH STABILITY: PHYSICAL HEALTH: ON AVERAGE, HOW MANY DAYS PER WEEK DO YOU ENGAGE IN MODERATE TO STRENUOUS EXERCISE (LIKE A BRISK WALK)?: 3 DAYS

## 2024-08-18 SDOH — HEALTH STABILITY: PHYSICAL HEALTH: ON AVERAGE, HOW MANY MINUTES DO YOU ENGAGE IN EXERCISE AT THIS LEVEL?: 20 MIN

## 2024-08-18 ASSESSMENT — SOCIAL DETERMINANTS OF HEALTH (SDOH): HOW OFTEN DO YOU GET TOGETHER WITH FRIENDS OR RELATIVES?: ONCE A WEEK

## 2024-08-19 ENCOUNTER — OFFICE VISIT (OUTPATIENT)
Dept: FAMILY MEDICINE | Facility: CLINIC | Age: 54
End: 2024-08-19
Payer: COMMERCIAL

## 2024-08-19 VITALS
SYSTOLIC BLOOD PRESSURE: 122 MMHG | TEMPERATURE: 98.4 F | OXYGEN SATURATION: 99 % | WEIGHT: 96.8 LBS | BODY MASS INDEX: 21.78 KG/M2 | RESPIRATION RATE: 16 BRPM | DIASTOLIC BLOOD PRESSURE: 84 MMHG | HEART RATE: 72 BPM | HEIGHT: 56 IN

## 2024-08-19 DIAGNOSIS — Z12.4 SCREENING FOR CERVICAL CANCER: ICD-10-CM

## 2024-08-19 DIAGNOSIS — Z00.00 ROUTINE GENERAL MEDICAL EXAMINATION AT A HEALTH CARE FACILITY: Primary | ICD-10-CM

## 2024-08-19 PROCEDURE — 90471 IMMUNIZATION ADMIN: CPT | Performed by: FAMILY MEDICINE

## 2024-08-19 PROCEDURE — G0145 SCR C/V CYTO,THINLAYER,RESCR: HCPCS | Performed by: FAMILY MEDICINE

## 2024-08-19 PROCEDURE — 87624 HPV HI-RISK TYP POOLED RSLT: CPT | Performed by: FAMILY MEDICINE

## 2024-08-19 PROCEDURE — 90750 HZV VACC RECOMBINANT IM: CPT | Performed by: FAMILY MEDICINE

## 2024-08-19 PROCEDURE — 99396 PREV VISIT EST AGE 40-64: CPT | Mod: 25 | Performed by: FAMILY MEDICINE

## 2024-08-19 NOTE — PROGRESS NOTES
Preventive Care Visit  [facility]  Sam Dennis MD, Family Medicine  Aug 19, 2024      Assessment & Plan     Routine general medical examination at a health care facility  Health maintenance updated, preventive services reviewed, vaccines discussed, questions are answered, patient encouraged to continue annual wellness visits to review preventive services      Screening for cervical cancer  Pap smear completed today  - Gynecologic Cytology (Pap) and HPV - Recommended Age 30-65 Years            Counseling  Appropriate preventive services were addressed with this patient via screening, questionnaire, or discussion as appropriate for fall prevention, nutrition, physical activity, Tobacco-use cessation, social engagement, weight loss and cognition.  Checklist reviewing preventive services available has been given to the patient.  Reviewed patient's diet, addressing concerns and/or questions.   She is at risk for lack of exercise and has been provided with information to increase physical activity for the benefit of her well-being.   She is at risk for psychosocial distress and has been provided with information to reduce risk.           Alejandro Zurita is a 54 year old, presenting for the following:  Physical        8/19/2024     3:18 PM   Additional Questions   Roomed by Rufino        Health Care Directive  Patient does not have a Health Care Directive or Living Will: Discussed advance care planning with patient; information given to patient to review.    HPI  Patient here for checkup  Notes that eye doctor told her she has rosacea. They are monitoring. Has improved without intervention. Will let us know if it flares up again.  Discussed menopause. Last period was January 2024. Some hot flashes.               8/18/2024   General Health   How would you rate your overall physical health? Good   Feel stress (tense, anxious, or unable to sleep) Only a little      (!) STRESS  CONCERN      8/18/2024   Nutrition   Three or more servings of calcium each day? Yes   Diet: Regular (no restrictions)   How many servings of fruit and vegetables per day? (!) 2-3   How many sweetened beverages each day? 0-1            8/18/2024   Exercise   Days per week of moderate/strenous exercise 3 days   Average minutes spent exercising at this level 20 min            8/18/2024   Social Factors   Frequency of gathering with friends or relatives Once a week   Worry food won't last until get money to buy more No   Food not last or not have enough money for food? No   Do you have housing? (Housing is defined as stable permanent housing and does not include staying ouside in a car, in a tent, in an abandoned building, in an overnight shelter, or couch-surfing.) Yes   Are you worried about losing your housing? No   Lack of transportation? No   Unable to get utilities (heat,electricity)? No            8/18/2024   Fall Risk   Fallen 2 or more times in the past year? No   Trouble with walking or balance? No             8/18/2024   Dental   Dentist two times every year? Yes            8/18/2024   TB Screening   Were you born outside of the [place]? No            Today's PHQ-2 Score:       8/18/2024    10:33 AM   PHQ-2 ( 1999 [employer])   Q1: Little interest or pleasure in doing things 0   Q2: Feeling down, depressed or hopeless 0   PHQ-2 Score 0   Q1: Little interest or pleasure in doing things Not at all   Q2: Feeling down, depressed or hopeless Not at all   PHQ-2 Score 0           8/18/2024   Substance Use   Alcohol more than 3/day or more than 7/wk No   Do you use any other substances recreationally? No        Social History     Tobacco Use    Smoking status: Never     Passive exposure: Never    Smokeless tobacco: Never   Vaping Use    Vaping status: Never Used   Substance Use Topics    Alcohol use: Yes     Comment: occasional social    Drug use: Never           4/3/2023   LAST FHS-7 RESULTS   1st degree relative breast or  "ovarian cancer No   Any relative bilateral breast cancer No   Any male have breast cancer No   Any ONE woman have BOTH breast AND ovarian cancer No   Any woman with breast cancer before 50yrs No   2 or more relatives with breast AND/OR ovarian cancer No   2 or more relatives with breast AND/OR bowel cancer No                   8/18/2024   STI Screening   New sexual partner(s) since last STI/HIV test? No        History of abnormal Pap smear: No - age 30- 64 PAP with HPV every 5 years recommended       ASCVD Risk   The 10-year ASCVD risk score (Marilee REVELES, et al., 2019) is: 1%    Values used to calculate the score:      Age: 54 years      Sex: Female      Is Non- : No      Diabetic: No      Tobacco smoker: No      Systolic Blood Pressure: 122 mmHg      Is BP treated: No      HDL Cholesterol: 74 mg/dL      Total Cholesterol: 203 mg/dL             Reviewed and updated as needed this visit by Provider   Tobacco  Allergies  Meds  Problems  Med Hx  Surg Hx  Fam Hx                     Objective    Exam  /84 (BP Location: Right arm, Patient Position: Sitting, Cuff Size: Adult Regular)   Pulse 72   Temp 98.4  F (36.9  C) (Tympanic)   Resp 16   Ht 1.425 m (4' 8.1\")   Wt 43.9 kg (96 lb 12.8 oz)   LMP 01/01/2024 (Approximate)   SpO2 99%   BMI 21.62 kg/m     Estimated body mass index is 21.62 kg/m  as calculated from the following:    Height as of this encounter: 1.425 m (4' 8.1\").    Weight as of this encounter: 43.9 kg (96 lb 12.8 oz).    Physical Exam  GENERAL: alert and no distress  EYES: Eyes grossly normal to inspection, PERRL and conjunctivae and sclerae normal  HENT: ear canals and TM's normal, nose and mouth without ulcers or lesions  NECK: no adenopathy, no asymmetry, masses, or scars  RESP: lungs clear to auscultation - no rales, rhonchi or wheezes  CV: regular rate and rhythm, normal S1 S2, no S3 or S4, no murmur, click or rub, no peripheral edema  ABDOMEN: soft, " nontender, no hepatosplenomegaly, no masses and bowel sounds normal   (female) w/bimanual: normal female external genitalia, normal urethral meatus, normal vaginal mucosa, and normal cervix/adnexa/uterus without masses or discharge  MS: no gross musculoskeletal defects noted, no edema  SKIN: no suspicious lesions or rashes  NEURO: Normal strength and tone, mentation intact and speech normal  PSYCH: mentation appears normal, affect normal/bright        Signed Electronically by: Sam Dennis MD

## 2024-08-20 NOTE — PATIENT INSTRUCTIONS
Patient Education   Preventive Care Advice   This is general advice given by our system to help you stay healthy. However, your care team may have specific advice just for you. Please talk to your care team about your preventive care needs.  Nutrition  Eat 5 or more servings of fruits and vegetables each day.  Try wheat bread, brown rice and whole grain pasta (instead of white bread, rice, and pasta).  Get enough calcium and vitamin D. Check the label on foods and aim for 100% of the RDA (recommended daily allowance).  Lifestyle  Exercise at least 150 minutes each week  (30 minutes a day, 5 days a week).  Do muscle strengthening activities 2 days a week. These help control your weight and prevent disease.  No smoking.  Wear sunscreen to prevent skin cancer.  Have a dental exam and cleaning every 6 months.  Yearly exams  See your health care team every year to talk about:  Any changes in your health.  Any medicines your care team has prescribed.  Preventive care, family planning, and ways to prevent chronic diseases.  Shots (vaccines)   HPV shots (up to age 26), if you've never had them before.  Hepatitis B shots (up to age 59), if you've never had them before.  COVID-19 shot: Get this shot when it's due.  Flu shot: Get a flu shot every year.  Tetanus shot: Get a tetanus shot every 10 years.  Pneumococcal, hepatitis A, and RSV shots: Ask your care team if you need these based on your risk.  Shingles shot (for age 50 and up)  General health tests  Diabetes screening:  Starting at age 35, Get screened for diabetes at least every 3 years.  If you are younger than age 35, ask your care team if you should be screened for diabetes.  Cholesterol test: At age 39, start having a cholesterol test every 5 years, or more often if advised.  Bone density scan (DEXA): At age 50, ask your care team if you should have this scan for osteoporosis (brittle bones).  Hepatitis C: Get tested at least once in your life.  STIs (sexually  transmitted infections)  Before age 24: Ask your care team if you should be screened for STIs.  After age 24: Get screened for STIs if you're at risk. You are at risk for STIs (including HIV) if:  You are sexually active with more than one person.  You don't use condoms every time.  You or a partner was diagnosed with a sexually transmitted infection.  If you are at risk for HIV, ask about PrEP medicine to prevent HIV.  Get tested for HIV at least once in your life, whether you are at risk for HIV or not.  Cancer screening tests  Cervical cancer screening: If you have a cervix, begin getting regular cervical cancer screening tests starting at age 21.  Breast cancer scan (mammogram): If you've ever had breasts, begin having regular mammograms starting at age 40. This is a scan to check for breast cancer.  Colon cancer screening: It is important to start screening for colon cancer at age 45.  Have a colonoscopy test every 10 years (or more often if you're at risk) Or, ask your provider about stool tests like a FIT test every year or Cologuard test every 3 years.  To learn more about your testing options, visit:   .  For help making a decision, visit:   https://bit.ly/jp82341.  Prostate cancer screening test: If you have a prostate, ask your care team if a prostate cancer screening test (PSA) at age 55 is right for you.  Lung cancer screening: If you are a current or former smoker ages 50 to 80, ask your care team if ongoing lung cancer screenings are right for you.  For informational purposes only. Not to replace the advice of your health care provider. Copyright   2023 [employer]. All rights reserved. Clinically reviewed by the [facility] Transitions Program. [employer] [id number] - REV 01/24.

## 2024-08-21 LAB
HPV HR 12 DNA CVX QL NAA+PROBE: NEGATIVE
HPV16 DNA CVX QL NAA+PROBE: NEGATIVE
HPV18 DNA CVX QL NAA+PROBE: NEGATIVE
HUMAN PAPILLOMA VIRUS FINAL DIAGNOSIS: NORMAL

## 2024-08-23 LAB
BKR AP ASSOCIATED HPV REPORT: NORMAL
BKR LAB AP GYN ADEQUACY: NORMAL
BKR LAB AP GYN INTERPRETATION: NORMAL
BKR LAB AP LMP: NORMAL
BKR LAB AP PREVIOUS ABNORMAL: NORMAL
PATH REPORT.COMMENTS IMP SPEC: NORMAL
PATH REPORT.COMMENTS IMP SPEC: NORMAL
PATH REPORT.RELEVANT HX SPEC: NORMAL

## 2025-07-21 ENCOUNTER — PATIENT OUTREACH (OUTPATIENT)
Dept: CARE COORDINATION | Facility: CLINIC | Age: 55
End: 2025-07-21
Payer: COMMERCIAL

## 2025-08-04 ENCOUNTER — PATIENT OUTREACH (OUTPATIENT)
Dept: CARE COORDINATION | Facility: CLINIC | Age: 55
End: 2025-08-04
Payer: COMMERCIAL